# Patient Record
Sex: FEMALE | Race: WHITE | URBAN - METROPOLITAN AREA
[De-identification: names, ages, dates, MRNs, and addresses within clinical notes are randomized per-mention and may not be internally consistent; named-entity substitution may affect disease eponyms.]

---

## 2018-01-02 ENCOUNTER — TRANSFERRED RECORDS (OUTPATIENT)
Dept: HEALTH INFORMATION MANAGEMENT | Facility: CLINIC | Age: 18
End: 2018-01-02

## 2018-01-12 ENCOUNTER — TRANSFERRED RECORDS (OUTPATIENT)
Dept: HEALTH INFORMATION MANAGEMENT | Facility: CLINIC | Age: 18
End: 2018-01-12

## 2018-01-15 ENCOUNTER — TELEPHONE (OUTPATIENT)
Dept: TRANSPLANT | Facility: CLINIC | Age: 18
End: 2018-01-15

## 2018-01-15 VITALS — BODY MASS INDEX: 18.12 KG/M2 | WEIGHT: 96 LBS | HEIGHT: 61 IN

## 2018-01-15 NOTE — LETTER
January 25, 2018    Peri Delacruz  [address] [facility] [address] [place] [address]      Dear Ms. Delacruz,   We are writing to inform you that we are unable to consider you for lung transplant evaluation at the [facility] at this time. After reviewing your referral information and through discussions with our team, we will require evidence of at least six months free of substance use. Per our policy, use of nicotine and any alcohol abuse or other substance abuse is an absolute contraindication to lung transplant candidacy.  Please contact our program after meeting this requirement if you have continued interest in considering evaluation at the [facility]. We will consider scheduling a new patient visit at that time and possibly proceeding with a candidacy evaluation.  Thank you for allowing us to participate in your care.  We wish you well.   Sincerely,    Ramona Browne RN Pre Lung Transplant Coordinator  Solid Organ Transplant  [facility], [facility]      cc: Care Team

## 2018-01-15 NOTE — LETTER
January 15, 2018    Peri Delacruz  [address] [facility] [facility] [address]  [facility] [address]    Fax: [phone]//Attn: Medical Records  Re: Peri Delacruz    MRN: [id number]     [facility],   Our mutual patient, Peri Delacruz has been referred for a lung transplant at the [facility].  In order to provide the best possible recommendations for this patient, we require some medical records as listed on the attached page.    All information needs to arrive at our facility by 1/18/2018.  If you have a PACS-to PACS connection, we request you push images to the [employer] [facility].  Please indicate if you have pushed images to us on your return cover letter.  If unable, scans may be burned onto a CD in DICOM format. All scans may be burned onto a CD in DICOM format.  Pathology slides, if requested,  should be accompanied by the appropriate report from your institution.    Please fax all paper records to [phone].    Please send all scans/slides to:   [facility]  Solid Organ Transplant Office  [address] [place] [address]    Please call our office at [phone] if you have any questions or concerns.  Please call or fax if the patient is hand carrying any information, or if any of the requested information is not at your facility.  Thank you for your assistance in caring for this patient!     Sincerely,   [facility]   Lung Transplant TeamRequest for Records from Referring Providers Office for Patients Referred to [facility] Lung Transplant Program  Images Needed to Process Intake of Patient:  o CXR Images (most recent)  o Chest CT Images (all within last 24 months or most recent)  o Heart Cath Images (most recent)  Records Needed to Process Intake of Patient:   o Any Alpha 1 Level and Typing available  o Cardiac Catheterization Results (most recent on file)  o Any Cystic  Fibrosis Genotyping available  o Chest CT Report (all within last 24 months or most recent)  o Chest X-Ray Report (all within last 24 months or most recent)  o Culture Results (last 2 years on file)  o ECHO Results (most recent on file)  o Hospital Discharge Summaries (last 2 years on file)  o Lab Results (most recent on file)  o History and Physical (original on file)  o Any Pathology Reports available  o Physicians Notes (last 3 reports on file)  o Pulmonary Function Test Results (last 3 reports on file)  o Pulmonary Testing (last 2 years on file)  o Radiology Reports (not including Chest X-ray, last 2 years on file)  o Rheumatology Notes (original note and most recent note on file)  o Six Minute Walk Results (most recent on file)    Please fax all paper records to [phone].    Please send all scans/slides to:   [facility]  Solid Organ Transplant Office  [address] [place] [address], [address] [address]  Please call our office at [phone] if you have any questions or concerns.

## 2018-01-15 NOTE — LETTER
January 15, 2018    Peri Delacruz  [address] [facility] [facility] [address]  [facility] [address]    Fax: [phone]//Attn: Medical Records  Re: Peri Delacruz    MRN: [id number]     [facility],   Our mutual patient, Peri Delacruz has been referred for a lung transplant at the [facility].  In order to provide the best possible recommendations for this patient, we require some medical records as listed on the attached page.    All information needs to arrive at our facility by 1/18/2018.  If you have a PACS-to PACS connection, we request you push images to the [employer] [facility].  Please indicate if you have pushed images to us on your return cover letter.  If unable, scans may be burned onto a CD in DICOM format. All scans may be burned onto a CD in DICOM format.  Pathology slides, if requested,  should be accompanied by the appropriate report from your institution.    Please fax all paper records to [phone].    Please send all scans/slides to:   [facility]  Solid Organ Transplant Office  [address] [place] [address]    Please call our office at [phone] if you have any questions or concerns.  Please call or fax if the patient is hand carrying any information, or if any of the requested information is not at your facility.  Thank you for your assistance in caring for this patient!     Sincerely,   [facility]                                                 Lung Transplant TeamRequest for Records from Primary Care Providers Office for Patients Referred to [facility] Lung Transplant Program  Images Needed to Process Intake of Patient:  o CXR Images (most recent)  o Chest CT Images (all within last 24 months or most recent)  o Heart Cath Images (most recent)  Records Needed to Process Intake of Patient:   o Chest CT Report (all within last 24 months or most  recent)  o Chest X-Ray Report (all within last 24 months or most recent)  o Colonoscopy Results with Pathology  o Consulting Physician History and Physical (last 3 reports on file)  o Culture Results (last 2 years on file)  o DEXA Scan Results (most recent on file)  o ECHO Results (most recent on file)  o Hospital Discharge Summaries (last 2 years on file)  o Immunization Records (most recent on file)  o Lab Results (most recent on file)  o Original History and Physical   o Physician Notes (last 3 reports on file)  o Pulmonary Function Testing Results (last 3 reports on file)  o Radiology Reports not including Chest X-rays (last 2 years on file)  o Rheumatology Provider Notes (original and most recent on file)  As applicable:   o Gynecological Exam (most recent on file)  o Mammogram Results (most recent on file)  o Pap Smear Results (most recent on file)  o PSA Lab Results (most recent on file)  Please fax all paper records to [phone].    Please send all scans/slides to:   [facility]  Solid Organ Transplant Office  [address] [place] [address]  Please call our office at [phone] if you have any questions or concerns.

## 2018-01-15 NOTE — TELEPHONE ENCOUNTER
Request to have PCP added into Epic:    Dr. Alonzo [facility]  [address] [address]  Ph: [phone]  Fax: [phone]

## 2018-01-15 NOTE — TELEPHONE ENCOUNTER
Intake Progress Note    Organ: Lung    Initial Call Made by: Self Referral. Received records from Dr. Tito Morrison. Patient is currently in patient at [facility]. Planning to d/c the end of this week    Referring Physician: Dr. Tito Morrison    Assigned Coordinator: Ramona Browne    Reported Diagnosis: CF    On Dialysis: No    Dialysis Schedule: N/A    Reported Medical History: In patient at [facility]    Records:  I will request.     Preferred Evaluation Start Date:  ASAP     Online Forms    Best time patient can be reached: Anytime on her Cell    Type of packet sent:  Lung Packet    Misc. Notes: May speak with emergency contacts listed: Y    Verbal Consent: Y    Informed patient to call if doesn't receive packet and or phone call from coordinator within given time: Y    Insurance: Medicare [id number]// [facility] ID #[id number]

## 2018-01-15 NOTE — LETTER
January 15, 2018    Peri Delacruz  [address] [facility], [address] [address]    Dear Peri,   Thank you for your interest in the Transplant Center at [facility], [facility].  We look forward to being a part of your care team and assisting you through the transplant process.  As we discussed, your transplant coordinator is Ramona Browne.  You may call your coordinator at any time with questions or concerns at [phone].  Please complete the following items.    1.  Sign up for:    [employer] (your electronic medical record)    [employer] (the Transplant Center s website- see enclosed booklet for more information)    The above tools can be used to learn more about your transplant, communicate with your care team and track your medical details.    2.  Fill out and return the following enclosed forms:    Transplant application    Authorization for Electronic Communication    Authorization to Discuss Protected Health Information    [employer] Release of Information    Best Wishes,     Solid Organ Transplant Intake  [facility], [facility]    Cc:  Referring Provider          PCP

## 2018-01-19 NOTE — TELEPHONE ENCOUNTER
SOT LUNG INTAKE    January 19, 2018    Peri Delacruz  [id number]   Body mass index is 18.14 kg/(m^2).  Referring Provider: Dr. Tito Morrison (Pulmonary), Dr. Alonzo Tavarez (PCP)  Diagnosis: CF, bronchiectasis  Source/Facility: [facility]    History:  Smoking/nicotine use history: stated none  Alcohol use history:  Stated none  Drug use history: stated none (hx of drug abuse, per clinic notes)  Cancer history: none  Cardiac history: none    Family History and Social History were completed.    Allergies were updated    Primary Care  PAP: stated unsure, has an OB/ GYN (agreed to get scheduled this year)  Dental: had a visit this past 1 year, stated needs to have a lower tooth extracted   Hepatitis A/B: ?  Pneumovax: none    Pulmonary Tests   PFTs: [facility]  1/11/18  6 Minute Walk: [facility]  6/19/17 (per clinic notes, desaturation to 91% on room air, does not require O2)  Sleep Study: none    Diagnostic Tests/Imaging  Heart cath: none  Stress Test: none  ECHO: [facility]  9/5/17     Chest CT: [facility]  7/12/17  DEXA: [facility]  1/30/17, osteopenia  Upper GI: stated has been diagnosed with GERD    Notes: Clinic notes- Inpt 11/29/17- 12/13/17for CF exacerbation, continued home regimen of MAC (rifampin, clarithromycin, levaquin, tobramycin and inhaled amikacin.       Contacted pt, currently inpt at [facility] since 1/2 and planned discharge next Tuesday as stated.  Is now on O2 1L continuously, informed of required primary care tests and vaccinations, agreed to schedule with her OB/ GYN and vaccines from her Pulmonologist.

## 2018-01-26 ENCOUNTER — TELEPHONE (OUTPATIENT)
Dept: TRANSPLANT | Facility: CLINIC | Age: 18
End: 2018-01-26

## 2018-01-30 ENCOUNTER — TELEPHONE (OUTPATIENT)
Dept: TRANSPLANT | Facility: CLINIC | Age: 18
End: 2018-01-30

## 2018-03-01 ENCOUNTER — TRANSFERRED RECORDS (OUTPATIENT)
Dept: HEALTH INFORMATION MANAGEMENT | Facility: CLINIC | Age: 18
End: 2018-03-01

## 2018-03-06 ENCOUNTER — TELEPHONE (OUTPATIENT)
Dept: TRANSPLANT | Facility: CLINIC | Age: 18
End: 2018-03-06

## 2018-03-06 RX ORDER — ALBUTEROL SULFATE 90 UG/1
2 AEROSOL, METERED RESPIRATORY (INHALATION) 4 TIMES DAILY PRN
COMMUNITY

## 2018-03-06 RX ORDER — ETHAMBUTOL HYDROCHLORIDE 100 MG/1
600 TABLET, FILM COATED ORAL
COMMUNITY

## 2018-03-06 RX ORDER — RIFAMPIN 300 MG/1
300 CAPSULE ORAL EVERY 12 HOURS
COMMUNITY

## 2018-03-06 RX ORDER — ONDANSETRON 4 MG/1
1 TABLET, ORALLY DISINTEGRATING ORAL EVERY 6 HOURS PRN
COMMUNITY

## 2018-03-06 RX ORDER — LORATADINE 10 MG/1
10 TABLET ORAL AT BEDTIME
COMMUNITY

## 2018-03-06 RX ORDER — BUSPIRONE HYDROCHLORIDE 15 MG/1
15 TABLET ORAL 2 TIMES DAILY
COMMUNITY

## 2018-03-06 RX ORDER — ALBUTEROL SULFATE 0.83 MG/ML
3 SOLUTION RESPIRATORY (INHALATION) 2 TIMES DAILY
COMMUNITY

## 2018-03-06 RX ORDER — CLARITHROMYCIN 500 MG
500 TABLET ORAL 2 TIMES DAILY
COMMUNITY

## 2018-03-06 RX ORDER — ZINC SULFATE 50(220)MG
1 CAPSULE ORAL DAILY
COMMUNITY

## 2018-03-06 RX ORDER — MULTIPLE VITAMINS W/ MINERALS TAB 9MG-400MCG
2 TAB ORAL AT BEDTIME
COMMUNITY

## 2018-03-06 RX ORDER — LORAZEPAM 0.5 MG/1
1 TABLET ORAL DAILY PRN
COMMUNITY

## 2018-03-12 ENCOUNTER — TELEPHONE (OUTPATIENT)
Dept: TRANSPLANT | Facility: CLINIC | Age: 18
End: 2018-03-12

## 2018-03-12 NOTE — TELEPHONE ENCOUNTER
Peri had called last week with a follow up today to inform coordinator of her latest admission to [facility] for a CF exacerbation requiring a 5 day stay with an addition of IV antibiotics to the regiment she was on PTA.  She recently had her wisdom teeth removed.  She states she has been attending pulmonary rehab, finds it beneficial and has not missed more than 1 or 2 appointments.  She states she still has pain, mainly in her chest and full body aches at times. She said she has been taking tylenol and ibuprofen on and off but that she has not really taken anything stronger for it in a while. She is not followed by a pain clinic.  She transitioned to the adult CF team in December.    I informed her the coordinator through the [facility] has been sending me her monthly utox results and that we would require at least one more month of negative results at which point I will touch base with Dr Russo to reassess NPV at that time.

## 2018-04-17 ENCOUNTER — TELEPHONE (OUTPATIENT)
Dept: TRANSPLANT | Facility: CLINIC | Age: 18
End: 2018-04-17

## 2018-04-17 NOTE — TELEPHONE ENCOUNTER
Provider Call: Provider Call: Transplant Provider  Route to RN  Facility Name: Dr Karolyn Crump  Facility Location:   Reason for Call: wants to review with Coordinator- transplant referral that Dr Russo is aware of.    Callback needed? Yes  Return Call Needed  Same as documented in contacts section  When to return call?: Same day: Route High Priority

## 2018-04-20 ENCOUNTER — TELEPHONE (OUTPATIENT)
Dept: TRANSPLANT | Facility: CLINIC | Age: 18
End: 2018-04-20

## 2018-04-20 DIAGNOSIS — E84.9 CF (CYSTIC FIBROSIS) (H): ICD-10-CM

## 2018-04-20 DIAGNOSIS — E08.9 DIABETES MELLITUS DUE TO UNDERLYING CONDITION WITHOUT COMPLICATION, UNSPECIFIED LONG TERM INSULIN USE STATUS: ICD-10-CM

## 2018-04-20 DIAGNOSIS — Z79.899 OTHER LONG TERM (CURRENT) DRUG THERAPY: ICD-10-CM

## 2018-04-20 DIAGNOSIS — E08.9 DIABETES MELLITUS DUE TO UNDERLYING CONDITION WITHOUT COMPLICATION (H): ICD-10-CM

## 2018-04-20 DIAGNOSIS — Z51.81 ENCOUNTER FOR THERAPEUTIC DRUG LEVEL MONITORING: ICD-10-CM

## 2018-04-20 DIAGNOSIS — R06.02 SHORTNESS OF BREATH: ICD-10-CM

## 2018-04-20 DIAGNOSIS — R73.02 IMPAIRED GLUCOSE TOLERANCE: ICD-10-CM

## 2018-04-20 DIAGNOSIS — Z79.3 LONG TERM CURRENT USE OF HORMONAL CONTRACEPTIVE: ICD-10-CM

## 2018-04-20 DIAGNOSIS — R93.89 ABNORMAL CT OF THE CHEST: ICD-10-CM

## 2018-04-20 DIAGNOSIS — Z76.82 ORGAN TRANSPLANT CANDIDATE: Primary | ICD-10-CM

## 2018-04-20 NOTE — TELEPHONE ENCOUNTER
Peri discharged from hospital yesterday. Had received xenia and meropenem inpatient and was being discharged on cefepime 2 G q 12 hours along with pulmonary rehab. Currently on xenia nebs.   Scheduled for full eval and NPT visit with Dr Russo week of 5/7.  Peri and her mother are planning on flying.  Provided accommodations number once again.  Encouraged review of [employer] educations videos prior to eval week.

## 2018-04-23 ENCOUNTER — HOSPITAL ENCOUNTER (OUTPATIENT)
Facility: CLINIC | Age: 18
End: 2018-04-23
Admitting: INTERNAL MEDICINE
Payer: MEDICARE

## 2018-04-23 ENCOUNTER — HOSPITAL ENCOUNTER (OUTPATIENT)
Facility: CLINIC | Age: 18
End: 2018-04-23
Attending: INTERNAL MEDICINE | Admitting: INTERNAL MEDICINE
Payer: MEDICARE

## 2018-04-24 ENCOUNTER — EXTERNAL HOSPITAL ADMISSION (OUTPATIENT)
Dept: TRANSPLANT | Facility: CLINIC | Age: 18
End: 2018-04-24

## 2018-04-24 NOTE — DISCHARGE SUMMARY
Physician Discharge Summary     Patient ID:  Peri Delacruz  [id number]  18 year old  2000    Admit date: (Not on file)    Discharge date and time: {DISCHARGE DATE:119492}     Admitting Physician: No admitting provider for patient encounter.     Discharge Physician: ***    Admission Diagnoses: There are no admission diagnoses documented for this encoun*    Discharge Diagnoses: ***    Admission Condition: {condition:588339}    Discharged Condition: {condition:028500}    Indication for Admission: ***    Hospital Course: ***    Consults: {consultation:374482}    Significant Diagnostic Studies: {diagnostics:593962}    Treatments: {Tx:596258}    Discharge Exam:  {exam; complete normal and system select:088553}    Disposition: {disposition:417778}    Patient Instructions:   Cannot display discharge medications since this is not an admission.    Activity: {discharge activity:625359}  Diet: {diet:248630}  Wound Care: {wound care:545019}    Follow-up with *** in {NUMBERS; 0-10:069886} {units:11}.    Signed:  Rmaona Browne  4/24/2018  2:45 PM

## 2018-04-24 NOTE — TELEPHONE ENCOUNTER
FUTURE VISIT INFORMATION      FUTURE VISIT INFORMATION:    Date: 05/09/2018    Time: 7:15    Location: [facility]  REFERRAL INFORMATION:    Referring provider:  SELF    Referring providers clinic:  N/A    Reason for visit/diagnosis  lung tx eval         RECORDS STATUS    ALL RECORDS IN EPIC   OFFICE VISIT: 04/24/2018 EXTERNAL HOSPITAL ADMISSION  TRANSFERRED RECORDS 01/12/2018 & 01/02/2018 [facility]

## 2018-04-24 NOTE — PROGRESS NOTES
Received call from Karolyn Crump from [facility] informing me Peri Delacruz was readmitted 4/23 with hemoptysis. She did have a repeat CT performed. She wanted to confirm that she was scheduled for a full evaluation this May.

## 2018-05-09 ENCOUNTER — PRE VISIT (OUTPATIENT)
Dept: OTOLARYNGOLOGY | Facility: CLINIC | Age: 18
End: 2018-05-09

## 2018-05-09 NOTE — OR NURSING
Call placed to patient to remind her of the scheduled manometry and 24 hour pH probe. Patient is no longer on the list of potential candidate for a transplant. Writer cancelled scheduled procedures.

## 2018-12-02 DIAGNOSIS — R06.02 SOB (SHORTNESS OF BREATH): Primary | ICD-10-CM

## 2019-02-06 PROBLEM — R06.02 SOB (SHORTNESS OF BREATH): Status: ACTIVE | Noted: 2019-02-06
